# Patient Record
Sex: FEMALE | Race: BLACK OR AFRICAN AMERICAN | NOT HISPANIC OR LATINO | Employment: OTHER | URBAN - METROPOLITAN AREA
[De-identification: names, ages, dates, MRNs, and addresses within clinical notes are randomized per-mention and may not be internally consistent; named-entity substitution may affect disease eponyms.]

---

## 2023-08-05 DIAGNOSIS — I10 PRIMARY HYPERTENSION: ICD-10-CM

## 2023-08-05 DIAGNOSIS — E78.00 PURE HYPERCHOLESTEROLEMIA: ICD-10-CM

## 2023-08-11 DIAGNOSIS — E78.00 PURE HYPERCHOLESTEROLEMIA: ICD-10-CM

## 2023-08-11 DIAGNOSIS — I10 PRIMARY HYPERTENSION: ICD-10-CM

## 2023-08-11 RX ORDER — ATORVASTATIN CALCIUM 20 MG/1
20 TABLET, FILM COATED ORAL DAILY
Qty: 90 TABLET | Refills: 0 | Status: SHIPPED | OUTPATIENT
Start: 2023-08-11 | End: 2023-09-11 | Stop reason: SDUPTHER

## 2023-08-11 RX ORDER — AMLODIPINE BESYLATE 2.5 MG/1
2.5 TABLET ORAL DAILY
Qty: 90 TABLET | Refills: 0 | Status: SHIPPED | OUTPATIENT
Start: 2023-08-11

## 2023-08-11 RX ORDER — METOPROLOL SUCCINATE 25 MG/1
25 TABLET, EXTENDED RELEASE ORAL DAILY
Qty: 90 TABLET | Refills: 0 | Status: SHIPPED | OUTPATIENT
Start: 2023-08-11

## 2023-08-13 RX ORDER — AMLODIPINE BESYLATE 2.5 MG/1
2.5 TABLET ORAL DAILY
Qty: 30 TABLET | Refills: 0 | Status: SHIPPED | OUTPATIENT
Start: 2023-08-13 | End: 2023-09-11 | Stop reason: SDUPTHER

## 2023-08-13 RX ORDER — METOPROLOL SUCCINATE 25 MG/1
TABLET, EXTENDED RELEASE ORAL
Qty: 30 TABLET | Refills: 0 | Status: SHIPPED | OUTPATIENT
Start: 2023-08-13 | End: 2023-09-11

## 2023-08-13 RX ORDER — ATORVASTATIN CALCIUM 20 MG/1
TABLET, FILM COATED ORAL
Qty: 30 TABLET | Refills: 0 | Status: SHIPPED | OUTPATIENT
Start: 2023-08-13 | End: 2023-09-11 | Stop reason: SDUPTHER

## 2023-09-11 ENCOUNTER — OFFICE VISIT (OUTPATIENT)
Dept: PRIMARY CARE | Facility: CLINIC | Age: 83
End: 2023-09-11
Payer: MEDICARE

## 2023-09-11 VITALS
HEART RATE: 65 BPM | WEIGHT: 172.6 LBS | SYSTOLIC BLOOD PRESSURE: 161 MMHG | BODY MASS INDEX: 30.58 KG/M2 | HEIGHT: 63 IN | TEMPERATURE: 97.7 F | DIASTOLIC BLOOD PRESSURE: 74 MMHG

## 2023-09-11 DIAGNOSIS — Z13.6 SCREENING FOR CARDIOVASCULAR CONDITION: ICD-10-CM

## 2023-09-11 DIAGNOSIS — Z23 NEED FOR VACCINATION: ICD-10-CM

## 2023-09-11 DIAGNOSIS — Z12.31 ENCOUNTER FOR SCREENING MAMMOGRAM FOR BREAST CANCER: ICD-10-CM

## 2023-09-11 DIAGNOSIS — R73.03 PREDIABETES: ICD-10-CM

## 2023-09-11 DIAGNOSIS — E78.00 PURE HYPERCHOLESTEROLEMIA: ICD-10-CM

## 2023-09-11 DIAGNOSIS — K21.9 GASTROESOPHAGEAL REFLUX DISEASE WITHOUT ESOPHAGITIS: ICD-10-CM

## 2023-09-11 DIAGNOSIS — Z00.00 MEDICARE ANNUAL WELLNESS VISIT, SUBSEQUENT: ICD-10-CM

## 2023-09-11 DIAGNOSIS — Z13.1 DIABETES MELLITUS SCREENING: ICD-10-CM

## 2023-09-11 DIAGNOSIS — Z78.0 ASYMPTOMATIC MENOPAUSAL STATE: ICD-10-CM

## 2023-09-11 DIAGNOSIS — I10 PRIMARY HYPERTENSION: ICD-10-CM

## 2023-09-11 DIAGNOSIS — Z00.00 ROUTINE GENERAL MEDICAL EXAMINATION AT HEALTH CARE FACILITY: Primary | ICD-10-CM

## 2023-09-11 DIAGNOSIS — N63.22 BREAST LUMP ON LEFT SIDE AT 11 O'CLOCK POSITION: ICD-10-CM

## 2023-09-11 DIAGNOSIS — R73.9 HYPERGLYCEMIA: ICD-10-CM

## 2023-09-11 DIAGNOSIS — E78.2 MIXED HYPERLIPIDEMIA: ICD-10-CM

## 2023-09-11 PROBLEM — H40.1130 PRIMARY OPEN ANGLE GLAUCOMA OF BOTH EYES: Status: ACTIVE | Noted: 2018-05-01

## 2023-09-11 PROBLEM — Z96.1 PSEUDOPHAKIA OF BOTH EYES: Status: ACTIVE | Noted: 2018-05-01

## 2023-09-11 PROBLEM — N28.89 MASS OF RIGHT KIDNEY: Status: ACTIVE | Noted: 2023-09-11

## 2023-09-11 PROBLEM — R92.8 ABNORMAL MAMMOGRAM OF LEFT BREAST: Status: ACTIVE | Noted: 2023-09-11

## 2023-09-11 PROCEDURE — 1159F MED LIST DOCD IN RCRD: CPT | Performed by: FAMILY MEDICINE

## 2023-09-11 PROCEDURE — G0439 PPPS, SUBSEQ VISIT: HCPCS | Performed by: FAMILY MEDICINE

## 2023-09-11 PROCEDURE — 3077F SYST BP >= 140 MM HG: CPT | Performed by: FAMILY MEDICINE

## 2023-09-11 PROCEDURE — 1170F FXNL STATUS ASSESSED: CPT | Performed by: FAMILY MEDICINE

## 2023-09-11 PROCEDURE — 3078F DIAST BP <80 MM HG: CPT | Performed by: FAMILY MEDICINE

## 2023-09-11 PROCEDURE — 90662 IIV NO PRSV INCREASED AG IM: CPT | Performed by: FAMILY MEDICINE

## 2023-09-11 PROCEDURE — G0008 ADMIN INFLUENZA VIRUS VAC: HCPCS | Performed by: FAMILY MEDICINE

## 2023-09-11 PROCEDURE — 1036F TOBACCO NON-USER: CPT | Performed by: FAMILY MEDICINE

## 2023-09-11 RX ORDER — ATORVASTATIN CALCIUM 20 MG/1
20 TABLET, FILM COATED ORAL DAILY
Qty: 90 TABLET | Refills: 1 | Status: SHIPPED | OUTPATIENT
Start: 2023-09-11

## 2023-09-11 RX ORDER — AMLODIPINE BESYLATE 2.5 MG/1
2.5 TABLET ORAL DAILY
Qty: 90 TABLET | Refills: 1 | Status: SHIPPED | OUTPATIENT
Start: 2023-09-11

## 2023-09-11 RX ORDER — METOPROLOL SUCCINATE 25 MG/1
25 TABLET, EXTENDED RELEASE ORAL DAILY
Qty: 90 TABLET | Refills: 1 | Status: SHIPPED | OUTPATIENT
Start: 2023-09-11

## 2023-09-11 ASSESSMENT — PATIENT HEALTH QUESTIONNAIRE - PHQ9
2. FEELING DOWN, DEPRESSED OR HOPELESS: NOT AT ALL
SUM OF ALL RESPONSES TO PHQ9 QUESTIONS 1 AND 2: 0
1. LITTLE INTEREST OR PLEASURE IN DOING THINGS: NOT AT ALL
2. FEELING DOWN, DEPRESSED OR HOPELESS: NOT AT ALL
1. LITTLE INTEREST OR PLEASURE IN DOING THINGS: NOT AT ALL
SUM OF ALL RESPONSES TO PHQ9 QUESTIONS 1 AND 2: 0

## 2023-09-11 ASSESSMENT — ENCOUNTER SYMPTOMS
OCCASIONAL FEELINGS OF UNSTEADINESS: 1
LOSS OF SENSATION IN FEET: 0
DEPRESSION: 0

## 2023-09-11 ASSESSMENT — ACTIVITIES OF DAILY LIVING (ADL)
BATHING: INDEPENDENT
DRESSING: INDEPENDENT

## 2023-09-11 NOTE — ASSESSMENT & PLAN NOTE
-Dexa scan ordered.  -mammogram ordered. Last 2021.  -blood test ordered.  -Influenza vaccine given.  COVID 19 vaccine recommended.   -Vit D supplement continued.

## 2023-09-11 NOTE — PROGRESS NOTES
Subjective   Patient ID: Fay Calzada is a 83 y.o. female who presents for Medicare Annual Wellness Visit Subsequent.  HPI  The patient is a 82 yo AA Female with a history of HTN, HLD,  here for her MWV.    A review of system was completed.  All systems were reviewed and were normal, except for the ones that are listed in the HPI.    Objective   Physical Exam  Constitutional:       Appearance: Normal appearance.   HENT:      Head: Normocephalic and atraumatic.      Right Ear: Tympanic membrane, ear canal and external ear normal.      Left Ear: Tympanic membrane, ear canal and external ear normal.      Nose: Nose normal.      Mouth/Throat:      Mouth: Mucous membranes are moist.      Pharynx: Oropharynx is clear.   Eyes:      Extraocular Movements: Extraocular movements intact.      Conjunctiva/sclera: Conjunctivae normal.      Pupils: Pupils are equal, round, and reactive to light.   Cardiovascular:      Rate and Rhythm: Normal rate and regular rhythm.      Pulses: Normal pulses.   Pulmonary:      Effort: Pulmonary effort is normal.      Breath sounds: Normal breath sounds.   Abdominal:      General: Abdomen is flat. Bowel sounds are normal.      Palpations: Abdomen is soft.   Musculoskeletal:         General: Normal range of motion.      Cervical back: Normal range of motion and neck supple.   Skin:     General: Skin is warm.      Comments: Breast and axilla exam done: 2 cm lump palpated at the left breast upper and outer quadrant, close to the anterior axilla tail.    Neurological:      General: No focal deficit present.      Mental Status: She is alert and oriented to person, place, and time. Mental status is at baseline.   Psychiatric:         Mood and Affect: Mood normal.         Behavior: Behavior normal.         Thought Content: Thought content normal.         Judgment: Judgment normal.     Assessment/Plan   Problem List Items Addressed This Visit       Primary hypertension     -controlled at  home.  -elevated today because she forgot to take her medicine last night.  -Metoprolol and Amlodipine refilled at the same dose.          Relevant Medications    amLODIPine (Norvasc) 2.5 mg tablet    metoprolol succinate XL (Toprol-XL) 25 mg 24 hr tablet    GERD (gastroesophageal reflux disease)    Mixed hyperlipidemia     -Atorvastatin refilled.  -blood test ordered.          Prediabetes    Hyperglycemia    Relevant Orders    Hemoglobin A1C    Diabetes mellitus screening    Relevant Orders    Hemoglobin A1C    Basic Metabolic Panel    Screening for cardiovascular condition    Relevant Orders    Lipid panel    Need for vaccination    Relevant Orders    Flu vaccine, high dose seasonal, preservative free    Asymptomatic menopausal state    Relevant Orders    XR DEXA bone density    Encounter for screening mammogram for breast cancer    Relevant Orders    BI mammo bilateral screening tomosynthesis    Routine general medical examination at [facility] care facility - Primary    Relevant Orders    1 Year Follow Up In Primary Care - Wellness Exam    Medicare annual wellness visit, subsequent     -Dexa scan ordered.  -mammogram ordered. Last 2021.  -blood test ordered.  -Influenza vaccine given.  COVID 19 vaccine recommended.   -Vit D supplement continued.          Breast lump on left side at 11 o'clock position    Relevant Orders    BI US breast limited left     Other Visit Diagnoses       Pure hypercholesterolemia        Relevant Medications    atorvastatin (Lipitor) 20 mg tablet           Patient to return to office in 6 months.

## 2023-09-11 NOTE — ASSESSMENT & PLAN NOTE
-controlled at home.  -elevated today because she forgot to take her medicine last night.  -Metoprolol and Amlodipine refilled at the same dose.

## 2023-09-11 NOTE — LETTER
October 2, 2023     Fay Barrett  [id number]  [facility] [address]      Dear Ms. Barrett:    Below are the results from your recent visit:    Resulted Orders   BI US breast limited left    Narrative    Interpreted By:  ANDREA HAYDEN MD  MRN: [id number]  Patient Name: FAY BARRETT     STUDY:  DIGITAL DIAG MAMM BILAT WITH MANDY; BREAST ULTRASOUND;  9/18/2023 9:18  am; 9/18/2023 9:50 am     ACCESSION NUMBER(S):  [id number]; [id number]     ORDERING CLINICIAN:  XIANG GODINEZ     INDICATION:  83-year-old woman presents for diagnostic evaluation of a palpable  area of clinical concern in the left breast felt by clinician.  Patient does not feel this area.     COMPARISON:  12/12/2017, 08/22/2016     FINDINGS:  MAMMOGRAPHY: 2D and tomosynthesis images were reviewed at 1 mm slice  thickness.     Density:  The breast tissue is heterogeneously dense, which may  obscure small masses.     No mammographic abnormality is identified in the area of clinical  concern as indicated in the referring clinician's note in the  upper-outer quadrant/axillary tail of the left breast. There is an  area of prior excisional biopsy in the left breast. No suspicious  masses or calcifications are identified in either breast.     ULTRASOUND: Targeted ultrasound was performed by a registered  sonographer and the interpreting radiologist in the upper-outer  quadrant of the left breast and axillary tail region, at the site of  reported palpable clinical concern. No focal sonographic  abnormalities are identified. A few nonenlarged benign lymph nodes  with cortical thickness of up to 2 mm are seen in the left axillary  tail region.       Impression    1. No mammographic or targeted sonographic abnormality identified in  the report area of palpable clinical concern in the upper-outer  quadrant and axillary tail of the left breast. Clinical follow-up is  recommended.  2. No mammographic or targeted sonographic evidence of malignancy  in  either breast.     BI-RADS CATEGORY:  BI-RADS Category:  2 Benign.  Recommendation:  Routine Screening Mammogram in 1 Year. If clinically  indicated.  Recommended Date:  1 Year.  Laterality:  Bilateral.     For any future breast imaging appointments, please call [phone]).     Patient letter sent SNORMS     MACRO:  None       Patient's mammogram and ultrasound of the breast showed no abnormal findings. the patient should make sure to notify the office should she notice any change in her breast. The radiologist is recommending a repeat mammogram in a year.    If you have any questions or concerns, please don't hesitate to call.         Sincerely,        Kelly Martinez MD

## 2024-06-25 ENCOUNTER — TELEPHONE (OUTPATIENT)
Dept: PRIMARY CARE | Facility: CLINIC | Age: 84
End: 2024-06-25
Payer: MEDICARE

## 2024-06-27 DIAGNOSIS — I10 PRIMARY HYPERTENSION: Primary | ICD-10-CM

## 2024-06-29 RX ORDER — METOPROLOL SUCCINATE 25 MG/1
25 TABLET, EXTENDED RELEASE ORAL DAILY
Qty: 30 TABLET | Refills: 0 | Status: SHIPPED | OUTPATIENT
Start: 2024-06-29

## 2024-09-19 ENCOUNTER — APPOINTMENT (OUTPATIENT)
Dept: PRIMARY CARE | Facility: CLINIC | Age: 84
End: 2024-09-19
Payer: MEDICARE

## 2024-09-19 VITALS
BODY MASS INDEX: 30.18 KG/M2 | TEMPERATURE: 98 F | WEIGHT: 169 LBS | HEART RATE: 54 BPM | DIASTOLIC BLOOD PRESSURE: 82 MMHG | SYSTOLIC BLOOD PRESSURE: 189 MMHG

## 2024-09-19 DIAGNOSIS — E55.9 VITAMIN D DEFICIENCY: ICD-10-CM

## 2024-09-19 DIAGNOSIS — Z00.00 MEDICARE ANNUAL WELLNESS VISIT, SUBSEQUENT: Primary | ICD-10-CM

## 2024-09-19 DIAGNOSIS — R73.9 HYPERGLYCEMIA: ICD-10-CM

## 2024-09-19 DIAGNOSIS — I10 PRIMARY HYPERTENSION: ICD-10-CM

## 2024-09-19 DIAGNOSIS — Z01.00 ENCOUNTER FOR VISION SCREENING: ICD-10-CM

## 2024-09-19 DIAGNOSIS — E78.00 PURE HYPERCHOLESTEROLEMIA: ICD-10-CM

## 2024-09-19 DIAGNOSIS — Z78.0 ASYMPTOMATIC MENOPAUSE: ICD-10-CM

## 2024-09-19 DIAGNOSIS — Z12.31 VISIT FOR SCREENING MAMMOGRAM: ICD-10-CM

## 2024-09-19 DIAGNOSIS — Z13.220 SCREENING CHOLESTEROL LEVEL: ICD-10-CM

## 2024-09-19 DIAGNOSIS — Z13.1 DIABETES MELLITUS SCREENING: ICD-10-CM

## 2024-09-19 PROCEDURE — G0439 PPPS, SUBSEQ VISIT: HCPCS | Performed by: FAMILY MEDICINE

## 2024-09-19 PROCEDURE — 3077F SYST BP >= 140 MM HG: CPT | Performed by: FAMILY MEDICINE

## 2024-09-19 PROCEDURE — 3079F DIAST BP 80-89 MM HG: CPT | Performed by: FAMILY MEDICINE

## 2024-09-19 PROCEDURE — 1159F MED LIST DOCD IN RCRD: CPT | Performed by: FAMILY MEDICINE

## 2024-09-19 PROCEDURE — 1170F FXNL STATUS ASSESSED: CPT | Performed by: FAMILY MEDICINE

## 2024-09-19 RX ORDER — FUROSEMIDE 20 MG/1
20 TABLET ORAL DAILY
Qty: 30 TABLET | Refills: 0 | Status: SHIPPED | OUTPATIENT
Start: 2024-09-19 | End: 2024-09-23 | Stop reason: SDUPTHER

## 2024-09-19 RX ORDER — METOPROLOL SUCCINATE 25 MG/1
25 TABLET, EXTENDED RELEASE ORAL DAILY
Qty: 90 TABLET | Refills: 1 | Status: SHIPPED | OUTPATIENT
Start: 2024-09-19 | End: 2024-09-23 | Stop reason: SDUPTHER

## 2024-09-19 RX ORDER — ATORVASTATIN CALCIUM 20 MG/1
20 TABLET, FILM COATED ORAL DAILY
Qty: 90 TABLET | Refills: 1 | Status: SHIPPED | OUTPATIENT
Start: 2024-09-19 | End: 2024-09-23 | Stop reason: SDUPTHER

## 2024-09-19 ASSESSMENT — ENCOUNTER SYMPTOMS
LOSS OF SENSATION IN FEET: 0
DEPRESSION: 0
OCCASIONAL FEELINGS OF UNSTEADINESS: 0

## 2024-09-19 ASSESSMENT — ACTIVITIES OF DAILY LIVING (ADL)
MANAGING_FINANCES: INDEPENDENT
TAKING_MEDICATION: INDEPENDENT
GROCERY_SHOPPING: INDEPENDENT
BATHING: INDEPENDENT
DRESSING: INDEPENDENT
DOING_HOUSEWORK: INDEPENDENT

## 2024-09-19 ASSESSMENT — PATIENT HEALTH QUESTIONNAIRE - PHQ9
1. LITTLE INTEREST OR PLEASURE IN DOING THINGS: NOT AT ALL
2. FEELING DOWN, DEPRESSED OR HOPELESS: NOT AT ALL
SUM OF ALL RESPONSES TO PHQ9 QUESTIONS 1 AND 2: 0

## 2024-09-19 NOTE — PROGRESS NOTES
Subjective   Patient ID: Fay Calzada is a 84 y.o. female who presents for Medicare Annual Wellness Visit Initial.  HPI  The patient is a 83 yo AA Female with a history of HTN, HLD,  here for her MWV.  No concerns today.  -mammogram ordered.   -dexa scan ordered.   -blood test ordered.   -eye exam due.  Done last in 2023.   -Hearing is intact.   -immunizations will be received at her local pharmacy.    A review of system was completed.  All systems were reviewed and were normal, except for the ones that are listed in the HPI.    Objective   Physical Exam  Constitutional:       Appearance: Normal appearance.   HENT:      Head: Normocephalic and atraumatic.      Right Ear: Tympanic membrane, ear canal and external ear normal.      Left Ear: Tympanic membrane, ear canal and external ear normal.      Nose: Nose normal.      Mouth/Throat:      Mouth: Mucous membranes are moist.      Pharynx: Oropharynx is clear.   Eyes:      Extraocular Movements: Extraocular movements intact.      Conjunctiva/sclera: Conjunctivae normal.      Pupils: Pupils are equal, round, and reactive to light.   Cardiovascular:      Rate and Rhythm: Normal rate and regular rhythm.      Pulses: Normal pulses.   Pulmonary:      Effort: Pulmonary effort is normal.      Breath sounds: Normal breath sounds.   Abdominal:      General: Abdomen is flat. Bowel sounds are normal.      Palpations: Abdomen is soft.   Musculoskeletal:         General: Normal range of motion.      Cervical back: Normal range of motion and neck supple.   Skin:     General: Skin is warm.   Neurological:      General: No focal deficit present.      Mental Status: She is alert and oriented to person, place, and time. Mental status is at baseline.   Psychiatric:         Mood and Affect: Mood normal.         Behavior: Behavior normal.         Thought Content: Thought content normal.         Judgment: Judgment normal.     Assessment/Plan   Problem List Items Addressed This Visit        Primary hypertension    Relevant Medications    metoprolol succinate XL (Toprol-XL) 25 mg 24 hr tablet    furosemide (Lasix) 20 mg tablet    Other Relevant Orders    LDL cholesterol, direct    CBC    Comprehensive Metabolic Panel    TSH with reflex to Free T4 if abnormal    Hyperglycemia    Relevant Orders    Hemoglobin A1C    Diabetes mellitus screening    Relevant Orders    Hemoglobin A1C    Medicare annual wellness visit, subsequent - Primary     -mammogram ordered.   -dexa scan ordered.   -blood test ordered.   -eye exam due.  Done last in 2023.   -Hearing is intact.   -immunizations will be received at her local pharmacy.         Pure hypercholesterolemia    Relevant Medications    atorvastatin (Lipitor) 20 mg tablet    Screening cholesterol level    Encounter for vision screening    Asymptomatic menopause    Relevant Orders    Vitamin D 25-Hydroxy,Total (for eval of Vitamin D levels)    XR DEXA bone density     Other Visit Diagnoses       Visit for screening mammogram        Relevant Orders    BI mammo bilateral screening tomosynthesis    Vitamin D deficiency        Relevant Orders    Vitamin D 25-Hydroxy,Total (for eval of Vitamin D levels)         Patient to return to office in 1 year for your next CPE.

## 2024-09-19 NOTE — ASSESSMENT & PLAN NOTE
-mammogram ordered.   -dexa scan ordered.   -blood test ordered.   -eye exam due.  Done last in 2023.   -Hearing is intact.   -immunizations will be received at her local pharmacy.

## 2024-09-23 DIAGNOSIS — I10 PRIMARY HYPERTENSION: ICD-10-CM

## 2024-09-23 DIAGNOSIS — E78.00 PURE HYPERCHOLESTEROLEMIA: Primary | ICD-10-CM

## 2024-09-24 RX ORDER — FUROSEMIDE 20 MG/1
20 TABLET ORAL DAILY
Qty: 90 TABLET | Refills: 1 | Status: SHIPPED | OUTPATIENT
Start: 2024-09-24

## 2024-09-24 RX ORDER — ATORVASTATIN CALCIUM 20 MG/1
20 TABLET, FILM COATED ORAL DAILY
Qty: 90 TABLET | Refills: 1 | Status: SHIPPED | OUTPATIENT
Start: 2024-09-24

## 2024-09-24 RX ORDER — METOPROLOL SUCCINATE 25 MG/1
25 TABLET, EXTENDED RELEASE ORAL DAILY
Qty: 90 TABLET | Refills: 1 | Status: SHIPPED | OUTPATIENT
Start: 2024-09-24

## 2024-09-29 ASSESSMENT — SLIT LAMP EXAM - LIDS
COMMENTS: GOOD POSITION
COMMENTS: GOOD POSITION

## 2024-09-29 ASSESSMENT — EXTERNAL EXAM - LEFT EYE: OS_EXAM: NORMAL

## 2024-09-29 ASSESSMENT — EXTERNAL EXAM - RIGHT EYE: OD_EXAM: NORMAL

## 2024-10-01 ENCOUNTER — OFFICE VISIT (OUTPATIENT)
Dept: OPHTHALMOLOGY | Facility: CLINIC | Age: 84
End: 2024-10-01
Payer: MEDICARE

## 2024-10-01 DIAGNOSIS — H26.492 PCO (POSTERIOR CAPSULAR OPACIFICATION), LEFT: ICD-10-CM

## 2024-10-01 DIAGNOSIS — H26.491 PCO (POSTERIOR CAPSULAR OPACIFICATION), RIGHT: ICD-10-CM

## 2024-10-01 DIAGNOSIS — Z96.1 PSEUDOPHAKIA: ICD-10-CM

## 2024-10-01 DIAGNOSIS — H40.1231 LOW-TENSION GLAUCOMA OF BOTH EYES, MILD STAGE: Primary | ICD-10-CM

## 2025-01-29 DIAGNOSIS — I10 PRIMARY HYPERTENSION: ICD-10-CM

## 2025-01-30 RX ORDER — METOPROLOL SUCCINATE 25 MG/1
25 TABLET, EXTENDED RELEASE ORAL DAILY
Qty: 90 TABLET | Refills: 0 | Status: SHIPPED | OUTPATIENT
Start: 2025-01-30

## 2025-01-30 RX ORDER — AMLODIPINE BESYLATE 2.5 MG/1
2.5 TABLET ORAL DAILY
Qty: 90 TABLET | Refills: 0 | Status: SHIPPED | OUTPATIENT
Start: 2025-01-30

## 2025-02-08 ENCOUNTER — HOSPITAL ENCOUNTER (OUTPATIENT)
Dept: RADIOLOGY | Facility: CLINIC | Age: 85
Discharge: HOME | End: 2025-02-08
Payer: MEDICARE

## 2025-02-08 ENCOUNTER — TELEPHONE (OUTPATIENT)
Dept: URGENT CARE | Age: 85
End: 2025-02-08

## 2025-02-08 ENCOUNTER — OFFICE VISIT (OUTPATIENT)
Dept: URGENT CARE | Age: 85
End: 2025-02-08
Payer: MEDICARE

## 2025-02-08 VITALS
OXYGEN SATURATION: 95 % | RESPIRATION RATE: 16 BRPM | SYSTOLIC BLOOD PRESSURE: 159 MMHG | DIASTOLIC BLOOD PRESSURE: 74 MMHG | BODY MASS INDEX: 30.18 KG/M2 | HEART RATE: 93 BPM | WEIGHT: 169 LBS | TEMPERATURE: 98.3 F

## 2025-02-08 DIAGNOSIS — R05.1 ACUTE COUGH: ICD-10-CM

## 2025-02-08 DIAGNOSIS — J40 BRONCHITIS: ICD-10-CM

## 2025-02-08 DIAGNOSIS — R05.1 ACUTE COUGH: Primary | ICD-10-CM

## 2025-02-08 PROCEDURE — 71046 X-RAY EXAM CHEST 2 VIEWS: CPT

## 2025-02-08 PROCEDURE — 71046 X-RAY EXAM CHEST 2 VIEWS: CPT | Performed by: RADIOLOGY

## 2025-02-08 RX ORDER — ALBUTEROL SULFATE 90 UG/1
2 INHALANT RESPIRATORY (INHALATION) EVERY 6 HOURS PRN
Qty: 18 G | Refills: 0 | Status: SHIPPED | OUTPATIENT
Start: 2025-02-08 | End: 2026-02-08

## 2025-02-08 RX ORDER — METHYLPREDNISOLONE 4 MG/1
TABLET ORAL
Qty: 21 TABLET | Refills: 0 | Status: SHIPPED | OUTPATIENT
Start: 2025-02-09 | End: 2025-02-14

## 2025-02-08 RX ORDER — AZITHROMYCIN 250 MG/1
TABLET, FILM COATED ORAL
Qty: 6 TABLET | Refills: 0 | Status: SHIPPED | OUTPATIENT
Start: 2025-02-08

## 2025-02-08 ASSESSMENT — PAIN SCALES - GENERAL: PAINLEVEL_OUTOF10: 0-NO PAIN

## 2025-02-08 ASSESSMENT — ENCOUNTER SYMPTOMS: COUGH: 1

## 2025-02-08 NOTE — PROGRESS NOTES
Subjective   Patient ID: Fay Calzada is a 84 y.o. female. They present today with a chief complaint of Cough (X 2 weeks).    History of Present Illness  Fay Calzada is a 84 y.o. female who presents to the clinic for 2 weeks of cough.  Patient states she has taken Mucinex with little relief.  Patient states she feels rundown and fatigued.  Patient has any cardiac history at this time. Pt denies any chest pain, sob, N/V at this time in clinic. '          Past Medical History  Allergies as of 02/08/2025 - Reviewed 02/08/2025   Allergen Reaction Noted    Amoxicillin-pot clavulanate Itching 11/15/2004    Losartan Unknown 06/05/2019    Quinine Unknown 09/11/2023    Amlodipine Rash 03/06/2019       (Not in a hospital admission)       Past Medical History:   Diagnosis Date    Other conditions influencing health status     History of vaginal delivery    Personal history of other diseases of the circulatory system     History of hypertension       Past Surgical History:   Procedure Laterality Date    OTHER SURGICAL HISTORY  06/18/2021    Tubal ligation        reports that she has never smoked. She has never used smokeless tobacco. She reports that she does not currently use alcohol. She reports that she does not use drugs.    Review of Systems  Review of Systems   Respiratory:  Positive for cough.    All other systems reviewed and are negative.                                 Objective    Vitals:    02/08/25 1355   BP: 159/74   Pulse: 93   Resp: 16   Temp: 36.8 °C (98.3 °F)   TempSrc: Oral   SpO2: 95%   Weight: 76.7 kg (169 lb)     No LMP recorded (lmp unknown). Patient is postmenopausal.    Physical Exam  Constitutional:       Appearance: Normal appearance.   Cardiovascular:      Rate and Rhythm: Normal rate and regular rhythm.   Pulmonary:      Breath sounds: Examination of the right-lower field reveals rhonchi and rales. Examination of the left-lower field reveals rhonchi and rales. Rhonchi and rales present.    Neurological:      General: No focal deficit present.      Mental Status: She is alert and oriented to person, place, and time. Mental status is at baseline.   Psychiatric:         Mood and Affect: Mood normal.         Behavior: Behavior normal.         Procedures    Point of Care Test & Imaging Results from this visit  No results found for this visit on 02/08/25.   XR chest 2 views    Result Date: 2/8/2025  Interpreted By:  Chente Hearn, STUDY: XR CHEST 2 VIEWS;  2/8/2025 4:00 pm   INDICATION: Signs/Symptoms:cough.   ,R05.1 Acute cough   COMPARISON: None.   ACCESSION NUMBER(S): [id number]   ORDERING CLINICIAN: SATHYA BILL   FINDINGS:         CARDIOMEDIASTINAL SILHOUETTE: Cardiomediastinal silhouette is normal in size and configuration.   LUNGS: Bronchial thickening. No localizing infiltrate, effusion or pneumothorax. Lungs are well inflated.   ABDOMEN: No remarkable upper abdominal findings.   BONES: No acute osseous changes.       1.  Bronchial thickening. No evidence of focal infiltrate.     MACRO: None   Signed by: Chente Hearn 2/8/2025 4:04 PM Dictation workstation:   [id number]     Diagnostic study results (if any) were reviewed by KOKO Del Cid-CNP.    Assessment/Plan   Allergies, medications, history, and pertinent labs/EKGs/Imaging reviewed by KOKO Del Cid-CNP.     Medical Decision Making  History and exam consistent with acute bronchitis. No evidence of pneumonia, sepsis or other acute cardiopulmonary pathology. Based on current exam XRAY is completed in office and negative for pneumonia or other significant pulmonary pathology. Based on current exam and past medical history, plan is for symptomatic therapies, zpack, medrol dose pack, albuterol, and mucinex DM. Case reviewed with supervising physician Dr. Azul. . Patient advised to return to clinic or go to the ED if symptoms change or worsen.    Orders and Diagnoses  Diagnoses and all orders for this visit:  Acute cough  -      XR chest 2 views; Future  Bronchitis  -     azithromycin (Zithromax) 250 mg tablet; Take 2 tabs (500 mg) by mouth today, then take 1 tab daily for 4 days.  -     methylPREDNISolone (Medrol Dospak) 4 mg tablets; Follow schedule on package instructions Do not fill before February 9, 2025.  -     dextromethorphan-guaifenesin (Mucinex DM)  mg 12 hr tablet; Take 1 tablet by mouth every 12 hours if needed for cough for up to 7 days. Do not crush, chew, or split.  -     albuterol 90 mcg/actuation inhaler; Inhale 2 puffs every 6 hours if needed for wheezing.      Medical Admin Record      Patient disposition: Home    Electronically signed by Liborio Gallardo, APRN-CNP  4:22 PM

## 2025-02-08 NOTE — TELEPHONE ENCOUNTER
Called pt and reviewed the xray results with pt. advised patient I have called in azithromycin, Medrol Dosepak, Mucinex DM, albuterol inhaler for symptom management.  Advised patient to follow-up with her primary care doctor next 5 to 7 days.  Advised patient if worsening symptoms go to local emergency department for further evaluation.  Patient verbalized understanding.

## 2025-02-08 NOTE — PATIENT INSTRUCTIONS
Azithromycin take as ordered.  Medrol Dosepak please take as prescribed on the blister pack.  Mucinex DM take 2 times daily as needed for cough.  Please take upwards of 2 weeks.  Albuterol as needed for SOB/wheezing.   Please follow-up with your primary care doctor next 5 to 7 days.  If worsening symptoms, please go to local emergency department for further evaluation.    Please follow up with your primary provider within one week if symptoms do not improve.  You may schedule an appointment online at [email or web address] or call [phone]. Go to the Emergency Department if symptoms significantly worsen or if you develop chest pain or shortness of breath.

## 2025-06-09 ENCOUNTER — APPOINTMENT (OUTPATIENT)
Dept: PRIMARY CARE | Facility: CLINIC | Age: 85
End: 2025-06-09
Payer: MEDICARE

## 2025-06-09 VITALS
DIASTOLIC BLOOD PRESSURE: 71 MMHG | WEIGHT: 166 LBS | SYSTOLIC BLOOD PRESSURE: 198 MMHG | BODY MASS INDEX: 29.64 KG/M2 | HEART RATE: 76 BPM | TEMPERATURE: 98.2 F

## 2025-06-09 DIAGNOSIS — E78.00 PURE HYPERCHOLESTEROLEMIA: Primary | ICD-10-CM

## 2025-06-09 DIAGNOSIS — I10 PRIMARY HYPERTENSION: ICD-10-CM

## 2025-06-09 DIAGNOSIS — J40 BRONCHITIS: ICD-10-CM

## 2025-06-09 DIAGNOSIS — Z13.1 DIABETES MELLITUS SCREENING: ICD-10-CM

## 2025-06-09 PROCEDURE — 3078F DIAST BP <80 MM HG: CPT | Performed by: FAMILY MEDICINE

## 2025-06-09 PROCEDURE — 3077F SYST BP >= 140 MM HG: CPT | Performed by: FAMILY MEDICINE

## 2025-06-09 PROCEDURE — 1159F MED LIST DOCD IN RCRD: CPT | Performed by: FAMILY MEDICINE

## 2025-06-09 PROCEDURE — 1036F TOBACCO NON-USER: CPT | Performed by: FAMILY MEDICINE

## 2025-06-09 PROCEDURE — G2211 COMPLEX E/M VISIT ADD ON: HCPCS | Performed by: FAMILY MEDICINE

## 2025-06-09 PROCEDURE — 99214 OFFICE O/P EST MOD 30 MIN: CPT | Performed by: FAMILY MEDICINE

## 2025-06-09 RX ORDER — METOPROLOL SUCCINATE 25 MG/1
25 TABLET, EXTENDED RELEASE ORAL DAILY
Qty: 90 TABLET | Refills: 0 | Status: SHIPPED | OUTPATIENT
Start: 2025-06-09 | End: 2025-06-11 | Stop reason: SDUPTHER

## 2025-06-09 RX ORDER — AMLODIPINE BESYLATE 5 MG/1
5 TABLET ORAL DAILY
Qty: 90 TABLET | Refills: 1 | Status: SHIPPED | OUTPATIENT
Start: 2025-06-09 | End: 2025-06-11 | Stop reason: SDUPTHER

## 2025-06-09 RX ORDER — FUROSEMIDE 20 MG/1
20 TABLET ORAL DAILY
Qty: 90 TABLET | Refills: 1 | Status: SHIPPED | OUTPATIENT
Start: 2025-06-09 | End: 2025-06-11 | Stop reason: SDUPTHER

## 2025-06-09 RX ORDER — ROSUVASTATIN CALCIUM 20 MG/1
20 TABLET, COATED ORAL DAILY
Qty: 90 TABLET | Refills: 1 | Status: SHIPPED | OUTPATIENT
Start: 2025-06-09 | End: 2025-06-11 | Stop reason: SDUPTHER

## 2025-06-09 RX ORDER — AMLODIPINE BESYLATE 2.5 MG/1
2.5 TABLET ORAL DAILY
Qty: 90 TABLET | Refills: 1 | Status: SHIPPED | OUTPATIENT
Start: 2025-06-09 | End: 2025-06-09

## 2025-06-09 RX ORDER — ALBUTEROL SULFATE 90 UG/1
2 INHALANT RESPIRATORY (INHALATION) EVERY 6 HOURS PRN
Qty: 18 G | Refills: 5 | Status: SHIPPED | OUTPATIENT
Start: 2025-06-09 | End: 2025-06-11 | Stop reason: SDUPTHER

## 2025-06-09 NOTE — PROGRESS NOTES
1-6-17 Chart reviewed in anticipation of discharge outreach call after patient's discharge from [facility].  Patient remains in patient there.  I did not contact the patient.  Will close this encounter.     Subjective   Patient ID: Fay Calzada is a 84 y.o. female who presents for Follow-up.  HPI    The patient is a 83 yo AA Female with a history of HTN, HLD,  here for her medication refills.  She ran out of her medication 3 days ago.  Atorvastatin causes itchiness.    A review of system was completed.  All systems were reviewed and were normal, except for the ones that are listed in the HPI.    Objective   Physical Exam  Constitutional:       Appearance: Normal appearance.   HENT:      Head: Normocephalic and atraumatic.      Right Ear: Tympanic membrane, ear canal and external ear normal.      Left Ear: Tympanic membrane, ear canal and external ear normal.      Nose: Nose normal.      Mouth/Throat:      Mouth: Mucous membranes are moist.      Pharynx: Oropharynx is clear.   Eyes:      Extraocular Movements: Extraocular movements intact.      Conjunctiva/sclera: Conjunctivae normal.      Pupils: Pupils are equal, round, and reactive to light.   Cardiovascular:      Rate and Rhythm: Normal rate and regular rhythm.      Pulses: Normal pulses.   Pulmonary:      Effort: Pulmonary effort is normal.      Breath sounds: Normal breath sounds.   Abdominal:      General: Abdomen is flat. Bowel sounds are normal.      Palpations: Abdomen is soft.   Musculoskeletal:         General: Normal range of motion.      Cervical back: Normal range of motion and neck supple.   Skin:     General: Skin is warm.   Neurological:      General: No focal deficit present.      Mental Status: She is alert and oriented to person, place, and time. Mental status is at baseline.   Psychiatric:         Mood and Affect: Mood normal.         Behavior: Behavior normal.         Thought Content: Thought content normal.         Judgment: Judgment normal.     Assessment/Plan   Problem List Items Addressed This Visit       Primary hypertension    -Not controlled and she ran out of medication 3 days ago.   -Amlodipine increased to 5 mg every day  TODAY.  -Continue metoprolol.  -Home BP monitoring done.          Relevant Medications    metoprolol succinate XL (Toprol-XL) 25 mg 24 hr tablet    rosuvastatin (Crestor) 20 mg tablet    furosemide (Lasix) 20 mg tablet    amLODIPine (Norvasc) 5 mg tablet    Other Relevant Orders    CBC    Comprehensive Metabolic Panel    Hemoglobin A1C    TSH with reflex to Free T4 if abnormal    LDL cholesterol, direct    Diabetes mellitus screening    Relevant Orders    Hemoglobin A1C    Pure hypercholesterolemia - Primary    Relevant Medications    rosuvastatin (Crestor) 20 mg tablet    Other Relevant Orders    CBC    Comprehensive Metabolic Panel    Hemoglobin A1C    TSH with reflex to Free T4 if abnormal    LDL cholesterol, direct    Bronchitis    Relevant Medications    albuterol 90 mcg/actuation inhaler      Patient to return to office in 6 months.

## 2025-06-09 NOTE — ASSESSMENT & PLAN NOTE
-Not controlled and she ran out of medication 3 days ago.   -Amlodipine increased to 5 mg every day TODAY.  -Continue metoprolol.  -Home BP monitoring done.

## 2025-06-10 DIAGNOSIS — I10 PRIMARY HYPERTENSION: ICD-10-CM

## 2025-06-10 DIAGNOSIS — E78.00 PURE HYPERCHOLESTEROLEMIA: ICD-10-CM

## 2025-06-10 DIAGNOSIS — J40 BRONCHITIS: Primary | ICD-10-CM

## 2025-06-10 RX ORDER — AMLODIPINE BESYLATE 5 MG/1
5 TABLET ORAL DAILY
Qty: 90 TABLET | Refills: 1 | Status: CANCELLED | OUTPATIENT
Start: 2025-06-10

## 2025-06-10 RX ORDER — FUROSEMIDE 20 MG/1
20 TABLET ORAL DAILY
Qty: 90 TABLET | Refills: 1 | Status: CANCELLED | OUTPATIENT
Start: 2025-06-10

## 2025-06-10 RX ORDER — ALBUTEROL SULFATE 90 UG/1
2 INHALANT RESPIRATORY (INHALATION) EVERY 6 HOURS PRN
Qty: 18 G | Refills: 5 | Status: CANCELLED | OUTPATIENT
Start: 2025-06-10 | End: 2026-06-10

## 2025-06-10 RX ORDER — ROSUVASTATIN CALCIUM 20 MG/1
20 TABLET, COATED ORAL DAILY
Qty: 90 TABLET | Refills: 1 | Status: CANCELLED | OUTPATIENT
Start: 2025-06-10 | End: 2026-07-15

## 2025-06-10 RX ORDER — METOPROLOL SUCCINATE 25 MG/1
25 TABLET, EXTENDED RELEASE ORAL DAILY
Qty: 90 TABLET | Refills: 0 | Status: CANCELLED | OUTPATIENT
Start: 2025-06-10

## 2025-06-11 DIAGNOSIS — I10 PRIMARY HYPERTENSION: ICD-10-CM

## 2025-06-11 DIAGNOSIS — E78.00 PURE HYPERCHOLESTEROLEMIA: ICD-10-CM

## 2025-06-11 DIAGNOSIS — J40 BRONCHITIS: ICD-10-CM

## 2025-06-11 RX ORDER — ROSUVASTATIN CALCIUM 20 MG/1
20 TABLET, COATED ORAL DAILY
Qty: 90 TABLET | Refills: 1 | Status: SHIPPED | OUTPATIENT
Start: 2025-06-11 | End: 2026-07-16

## 2025-06-11 RX ORDER — ALBUTEROL SULFATE 90 UG/1
2 INHALANT RESPIRATORY (INHALATION) EVERY 6 HOURS PRN
Qty: 18 G | Refills: 5 | Status: SHIPPED | OUTPATIENT
Start: 2025-06-11 | End: 2026-06-11

## 2025-06-11 RX ORDER — FUROSEMIDE 20 MG/1
20 TABLET ORAL DAILY
Qty: 90 TABLET | Refills: 1 | Status: SHIPPED | OUTPATIENT
Start: 2025-06-11

## 2025-06-11 RX ORDER — AMLODIPINE BESYLATE 5 MG/1
5 TABLET ORAL DAILY
Qty: 90 TABLET | Refills: 1 | Status: SHIPPED | OUTPATIENT
Start: 2025-06-11

## 2025-06-11 RX ORDER — METOPROLOL SUCCINATE 25 MG/1
25 TABLET, EXTENDED RELEASE ORAL DAILY
Qty: 90 TABLET | Refills: 0 | Status: SHIPPED | OUTPATIENT
Start: 2025-06-11